# Patient Record
Sex: MALE | Race: WHITE | NOT HISPANIC OR LATINO
[De-identification: names, ages, dates, MRNs, and addresses within clinical notes are randomized per-mention and may not be internally consistent; named-entity substitution may affect disease eponyms.]

---

## 2017-06-06 ENCOUNTER — APPOINTMENT (OUTPATIENT)
Dept: CHRONIC DISEASE MANAGEMENT | Facility: CLINIC | Age: 22
End: 2017-06-06

## 2017-06-06 VITALS — HEIGHT: 68 IN | BODY MASS INDEX: 44.1 KG/M2 | WEIGHT: 291 LBS

## 2017-07-17 ENCOUNTER — OUTPATIENT (OUTPATIENT)
Dept: OUTPATIENT SERVICES | Facility: HOSPITAL | Age: 22
LOS: 1 days | End: 2017-07-17

## 2017-07-17 VITALS
HEART RATE: 82 BPM | OXYGEN SATURATION: 97 % | TEMPERATURE: 98 F | SYSTOLIC BLOOD PRESSURE: 110 MMHG | DIASTOLIC BLOOD PRESSURE: 64 MMHG | RESPIRATION RATE: 16 BRPM | WEIGHT: 283.96 LBS | HEIGHT: 68 IN

## 2017-07-17 DIAGNOSIS — J35.1 HYPERTROPHY OF TONSILS: ICD-10-CM

## 2017-07-17 DIAGNOSIS — J34.3 HYPERTROPHY OF NASAL TURBINATES: ICD-10-CM

## 2017-07-17 DIAGNOSIS — G47.33 OBSTRUCTIVE SLEEP APNEA (ADULT) (PEDIATRIC): ICD-10-CM

## 2017-07-17 LAB
HCT VFR BLD CALC: 47.1 % — SIGNIFICANT CHANGE UP (ref 39–50)
HGB BLD-MCNC: 15.9 G/DL — SIGNIFICANT CHANGE UP (ref 13–17)
MCHC RBC-ENTMCNC: 29.3 PG — SIGNIFICANT CHANGE UP (ref 27–34)
MCHC RBC-ENTMCNC: 33.8 % — SIGNIFICANT CHANGE UP (ref 32–36)
MCV RBC AUTO: 86.7 FL — SIGNIFICANT CHANGE UP (ref 80–100)
NRBC # FLD: 0 — SIGNIFICANT CHANGE UP
PLATELET # BLD AUTO: 290 K/UL — SIGNIFICANT CHANGE UP (ref 150–400)
PMV BLD: 9.2 FL — SIGNIFICANT CHANGE UP (ref 7–13)
RBC # BLD: 5.43 M/UL — SIGNIFICANT CHANGE UP (ref 4.2–5.8)
RBC # FLD: 12.8 % — SIGNIFICANT CHANGE UP (ref 10.3–14.5)
WBC # BLD: 6.36 K/UL — SIGNIFICANT CHANGE UP (ref 3.8–10.5)
WBC # FLD AUTO: 6.36 K/UL — SIGNIFICANT CHANGE UP (ref 3.8–10.5)

## 2017-07-17 RX ORDER — SODIUM CHLORIDE 9 MG/ML
1000 INJECTION, SOLUTION INTRAVENOUS
Qty: 0 | Refills: 0 | Status: DISCONTINUED | OUTPATIENT
Start: 2017-08-01 | End: 2017-08-16

## 2017-07-17 NOTE — H&P PST ADULT - NEGATIVE ENMT SYMPTOMS
no hearing difficulty/no nasal discharge/no nasal congestion/no vertigo/no gum bleeding/no sinus symptoms/no nose bleeds/no ear pain/no tinnitus

## 2017-07-17 NOTE — H&P PST ADULT - PROBLEM SELECTOR PLAN 1
22 yr old male scheduled for Septoplasty, Bilateral turbinectomy, Tonsillectomy on 8/01/2017  Pre op instruction given and patient verbalized understanding

## 2017-07-17 NOTE — H&P PST ADULT - RS GEN PE MLT RESP DETAILS PC
no wheezes/no rhonchi/no rales/respirations non-labored/airway patent/breath sounds equal/clear to auscultation bilaterally

## 2017-07-17 NOTE — H&P PST ADULT - HISTORY OF PRESENT ILLNESS
22 yr old male presents to PST with pre op dx: Hypertrophy of nasal turbinates, Hypertrophy of tonsils  for pre op evaluation, scheduled for Septoplasty, Bilateral turbinectomy Tonsillectomy on 8/01/2017

## 2017-07-25 ENCOUNTER — APPOINTMENT (OUTPATIENT)
Dept: CHRONIC DISEASE MANAGEMENT | Facility: CLINIC | Age: 22
End: 2017-07-25

## 2017-08-01 ENCOUNTER — RESULT REVIEW (OUTPATIENT)
Age: 22
End: 2017-08-01

## 2017-08-01 ENCOUNTER — OUTPATIENT (OUTPATIENT)
Dept: OUTPATIENT SERVICES | Facility: HOSPITAL | Age: 22
LOS: 1 days | Discharge: HOME | End: 2017-08-01
Payer: COMMERCIAL

## 2017-08-01 ENCOUNTER — APPOINTMENT (OUTPATIENT)
Dept: OTOLARYNGOLOGY | Facility: HOSPITAL | Age: 22
End: 2017-08-01

## 2017-08-01 VITALS
HEIGHT: 68 IN | SYSTOLIC BLOOD PRESSURE: 136 MMHG | DIASTOLIC BLOOD PRESSURE: 72 MMHG | WEIGHT: 283.96 LBS | RESPIRATION RATE: 16 BRPM | TEMPERATURE: 98 F | HEART RATE: 82 BPM | OXYGEN SATURATION: 97 %

## 2017-08-01 DIAGNOSIS — J35.1 HYPERTROPHY OF TONSILS: ICD-10-CM

## 2017-08-01 PROCEDURE — 88311 DECALCIFY TISSUE: CPT | Mod: 26

## 2017-08-01 PROCEDURE — 30130 EXCISE INFERIOR TURBINATE: CPT | Mod: 50,GC

## 2017-08-01 PROCEDURE — 88304 TISSUE EXAM BY PATHOLOGIST: CPT | Mod: 26

## 2017-08-01 PROCEDURE — 42826 REMOVAL OF TONSILS: CPT | Mod: GC

## 2017-08-01 PROCEDURE — 30520 REPAIR OF NASAL SEPTUM: CPT | Mod: GC

## 2017-08-01 RX ORDER — ONDANSETRON 8 MG/1
4 TABLET, FILM COATED ORAL ONCE
Qty: 0 | Refills: 0 | Status: DISCONTINUED | OUTPATIENT
Start: 2017-08-01 | End: 2017-08-16

## 2017-08-01 RX ORDER — ACETAMINOPHEN 500 MG
650 TABLET ORAL EVERY 6 HOURS
Qty: 0 | Refills: 0 | Status: DISCONTINUED | OUTPATIENT
Start: 2017-08-01 | End: 2017-08-16

## 2017-08-01 RX ORDER — HYDROMORPHONE HYDROCHLORIDE 2 MG/ML
0.5 INJECTION INTRAMUSCULAR; INTRAVENOUS; SUBCUTANEOUS
Qty: 0 | Refills: 0 | Status: DISCONTINUED | OUTPATIENT
Start: 2017-08-01 | End: 2017-08-01

## 2017-08-01 RX ORDER — ERGOCALCIFEROL 1.25 MG/1
1 CAPSULE ORAL
Qty: 0 | Refills: 0 | COMMUNITY

## 2017-08-01 RX ORDER — OXYCODONE HYDROCHLORIDE 5 MG/1
10 TABLET ORAL EVERY 6 HOURS
Qty: 0 | Refills: 0 | Status: DISCONTINUED | OUTPATIENT
Start: 2017-08-01 | End: 2017-08-01

## 2017-08-01 RX ORDER — OXYCODONE HYDROCHLORIDE 5 MG/1
5 TABLET ORAL EVERY 4 HOURS
Qty: 0 | Refills: 0 | Status: DISCONTINUED | OUTPATIENT
Start: 2017-08-01 | End: 2017-08-01

## 2017-08-01 RX ORDER — CEPHALEXIN 500 MG
500 CAPSULE ORAL EVERY 12 HOURS
Qty: 0 | Refills: 0 | Status: DISCONTINUED | OUTPATIENT
Start: 2017-08-01 | End: 2017-08-16

## 2017-08-01 RX ORDER — OXYCODONE AND ACETAMINOPHEN 5; 325 MG/1; MG/1
2 TABLET ORAL EVERY 6 HOURS
Qty: 0 | Refills: 0 | Status: DISCONTINUED | OUTPATIENT
Start: 2017-08-01 | End: 2017-08-01

## 2017-08-01 RX ORDER — BENZOCAINE AND MENTHOL 5; 1 G/100ML; G/100ML
1 LIQUID ORAL
Qty: 0 | Refills: 0 | Status: DISCONTINUED | OUTPATIENT
Start: 2017-08-01 | End: 2017-08-16

## 2017-08-01 RX ADMIN — HYDROMORPHONE HYDROCHLORIDE 0.5 MILLIGRAM(S): 2 INJECTION INTRAMUSCULAR; INTRAVENOUS; SUBCUTANEOUS at 19:08

## 2017-08-01 RX ADMIN — SODIUM CHLORIDE 30 MILLILITER(S): 9 INJECTION, SOLUTION INTRAVENOUS at 11:37

## 2017-08-01 RX ADMIN — HYDROMORPHONE HYDROCHLORIDE 0.5 MILLIGRAM(S): 2 INJECTION INTRAMUSCULAR; INTRAVENOUS; SUBCUTANEOUS at 18:56

## 2017-08-01 RX ADMIN — Medication 500 MILLIGRAM(S): at 18:59

## 2017-08-01 NOTE — PROGRESS NOTE ADULT - SUBJECTIVE AND OBJECTIVE BOX
Pt. fully recovered from Anesthesia.  No anesthesia complications.  AOX3, VSS.  Transfer care to Otolaryngology.  Pt. to remain in PACU overnight for LUIGI precautions.

## 2017-08-01 NOTE — PATIENT PROFILE ADULT. - FUNCTIONAL SCREEN CURRENT LEVEL: COMMUNICATION, MLM
(3) unable to understand or speak (not related to language barrier) (0) understands/communicates without difficulty

## 2017-08-02 ENCOUNTER — TRANSCRIPTION ENCOUNTER (OUTPATIENT)
Age: 22
End: 2017-08-02

## 2017-08-02 VITALS
RESPIRATION RATE: 16 BRPM | TEMPERATURE: 99 F | HEART RATE: 100 BPM | DIASTOLIC BLOOD PRESSURE: 64 MMHG | OXYGEN SATURATION: 97 % | SYSTOLIC BLOOD PRESSURE: 129 MMHG

## 2017-08-02 RX ORDER — CEPHALEXIN 500 MG
1 CAPSULE ORAL
Qty: 14 | Refills: 0 | OUTPATIENT
Start: 2017-08-02 | End: 2017-08-09

## 2017-08-02 RX ORDER — SODIUM CHLORIDE 0.65 %
2 AEROSOL, SPRAY (ML) NASAL
Qty: 1 | Refills: 4 | OUTPATIENT
Start: 2017-08-02

## 2017-08-02 RX ADMIN — Medication 500 MILLIGRAM(S): at 06:10

## 2017-08-02 NOTE — DISCHARGE NOTE ADULT - MEDICATION SUMMARY - MEDICATIONS TO TAKE
I will START or STAY ON the medications listed below when I get home from the hospital:    oxycodone-acetaminophen 5mg-325mg oral tablet  -- 1 tab(s) by mouth every 6 hours, As Needed -for severe pain MDD:4-6  -- Caution federal law prohibits the transfer of this drug to any person other  than the person for whom it was prescribed.  May cause drowsiness.  Alcohol may intensify this effect.  Use care when operating dangerous machinery.  This prescription cannot be refilled.  This product contains acetaminophen.  Do not use  with any other product containing acetaminophen to prevent possible liver damage.  Using more of this medication than prescribed may cause serious breathing problems.    -- Indication: For pain    cephalexin 500 mg oral capsule  -- 1 cap(s) by mouth every 12 hours  -- Indication: For post-op antibiiotic    Ayr Saline 0.65% nasal solution  -- 2 drop(s) into nose every 2 hours  -- For the nose.    -- Indication: For post op nasal spray    ergocalciferol 50,000 intl units (1.25 mg) oral capsule  -- 1 cap(s) by mouth once a week monday  -- Indication: For Home med

## 2017-08-02 NOTE — DISCHARGE NOTE ADULT - ADDITIONAL INSTRUCTIONS
Follow-up with Dr. Frances office in 1 week - [phone]    take keflex twice a day while packing in place    start nasal saline sprays in 24hours, 2 sprays each nostril every 2-3 hours while awake

## 2017-08-02 NOTE — DISCHARGE NOTE ADULT - NS AS ACTIVITY OBS
no heavy lifting or strenuous activities for 3-4 weeks No Heavy lifting/straining/Do not make important decisions/no heavy lifting or strenuous activities for 3-4 weeks

## 2017-08-02 NOTE — DISCHARGE NOTE ADULT - CARE PROVIDER_API CALL
Bogdan Moran), Otolaryngology  [address] [place] [id number]  Phone: [phone]  Fax: [phone]

## 2017-08-02 NOTE — PROGRESS NOTE ADULT - SUBJECTIVE AND OBJECTIVE BOX
Patient seen and examined.     No acute events overnight. No desats, no significant bleeding, pain adequately controlled.    NAD, alert, awake  Breathing comfortably on RA  Moustache dressing with SS drainage, packing removed    A/P  22M s/p septo, turbs, tonsillectomy 8/1.  -pain control  -keflex 500 QID  -d/c home

## 2017-08-02 NOTE — DISCHARGE NOTE ADULT - PLAN OF CARE
improve post op reg diet In the event you develop a complication and you are unable to reach your own physician, you may contact:  Ambulatory Surgery Unit [phone] Monday-Friday, 6am -9pm, or the emergency department at [phone]. also PACU [phone] reg diet,progress slowly:clear liquids to full liquids to soft foods as tolerated For the next 12hrs,DO NOT:Drive a car,operate power tools or machinery,drink alcohol,beer,or wine,make importatnt personal or business decisions. When taking pain meds - take with food and know it may cause constipation - Do NOT drive while on narcotics. Notify your physician if you experience:bleeding that does not stop,excessive swelling,persistent nausea & vomitting,inability to tolerate liquids or food,inability to urinate,pain not relieved by  medications,or fever greater than 101.

## 2017-08-02 NOTE — DISCHARGE NOTE ADULT - INSTRUCTIONS
reg diet change moustache dressing as needed as instructed reg diet,progress diet as tolerated:clear liquiods,full liquids then soft foods

## 2017-08-02 NOTE — DISCHARGE NOTE ADULT - HOSPITAL COURSE
s/p septo, inf turb reduction, tonsillectomy, did well post-op, tolerating PO well and packing removed on POD1 without issues. Was deemed hemodynamically stable for discharg [facility].

## 2017-08-02 NOTE — DISCHARGE NOTE ADULT - CARE PLAN
Principal Discharge DX:	Deviated nasal septum  Goal:	improve post op  Instructions for follow-up, activity and diet:	reg diet Principal Discharge DX:	Deviated nasal septum  Goal:	improve post op  Instructions for follow-up, activity and diet:	reg diet,progress slowly:clear liquids to full liquids to soft foods as tolerated  Instructions for follow-up, activity and diet:	In the event you develop a complication and you are unable to reach your own physician, you may contact:  Ambulatory Surgery Unit [phone] Monday-Friday, 6am -9pm, or the emergency department at [phone]. also PACU [phone] Principal Discharge DX:	Deviated nasal septum  Goal:	improve post op  Instructions for follow-up, activity and diet:	reg diet,progress slowly:clear liquids to full liquids to soft foods as tolerated  Instructions for follow-up, activity and diet:	In the event you develop a complication and you are unable to reach your own physician, you may contact:  Ambulatory Surgery Unit [phone] Monday-Friday, 6am -9pm, or the emergency department at [phone]. also PACU [phone]  Instructions for follow-up, activity and diet:	For the next 12hrs,DO NOT:Drive a car,operate power tools or machinery,drink alcohol,beer,or wine,make importatnt personal or business decisions.  Goal:	When taking pain meds - take with food and know it may cause constipation - Do NOT drive while on narcotics.  Goal:	Notify your physician if you experience:bleeding that does not stop,excessive swelling,persistent nausea & vomitting,inability to tolerate liquids or food,inability to urinate,pain not relieved by  medications,or fever greater than 101.

## 2017-08-02 NOTE — DISCHARGE NOTE ADULT - PATIENT PORTAL LINK FT
“You can access the Follow[facility] Patient Portal, offered by [facility], by registering with the following website: [email or web address]”

## 2017-08-07 LAB — SURGICAL PATHOLOGY STUDY: SIGNIFICANT CHANGE UP

## 2017-08-09 ENCOUNTER — APPOINTMENT (OUTPATIENT)
Dept: OTOLARYNGOLOGY | Facility: CLINIC | Age: 22
End: 2017-08-09
Payer: COMMERCIAL

## 2017-08-09 PROCEDURE — 99024 POSTOP FOLLOW-UP VISIT: CPT

## 2017-11-10 ENCOUNTER — APPOINTMENT (OUTPATIENT)
Dept: OTOLARYNGOLOGY | Facility: CLINIC | Age: 22
End: 2017-11-10
Payer: COMMERCIAL

## 2017-11-10 VITALS
HEIGHT: 68 IN | SYSTOLIC BLOOD PRESSURE: 126 MMHG | WEIGHT: 276 LBS | RESPIRATION RATE: 18 BRPM | DIASTOLIC BLOOD PRESSURE: 80 MMHG | BODY MASS INDEX: 41.83 KG/M2 | HEART RATE: 92 BPM

## 2017-11-10 VITALS
HEIGHT: 68 IN | SYSTOLIC BLOOD PRESSURE: 126 MMHG | WEIGHT: 276 LBS | HEART RATE: 92 BPM | DIASTOLIC BLOOD PRESSURE: 80 MMHG | BODY MASS INDEX: 41.83 KG/M2

## 2017-11-10 DIAGNOSIS — J35.1 HYPERTROPHY OF TONSILS: ICD-10-CM

## 2017-11-10 DIAGNOSIS — J34.2 DEVIATED NASAL SEPTUM: ICD-10-CM

## 2017-11-10 DIAGNOSIS — R73.03 PREDIABETES.: ICD-10-CM

## 2017-11-10 DIAGNOSIS — Z82.2 FAMILY HISTORY OF DEAFNESS AND HEARING LOSS: ICD-10-CM

## 2017-11-10 PROCEDURE — 99213 OFFICE O/P EST LOW 20 MIN: CPT

## 2019-02-04 NOTE — ASU PATIENT PROFILE, ADULT - TOBACCO USE
Last filled 12/28/18 Reviewed report generated by the [facility]. Does not demonstrate aberrancies or inconsistencies with regard to the prescribing of controlled medications to this patient by other providers. Never smoker

## 2019-04-15 NOTE — PATIENT PROFILE ADULT. - CAREGIVER ADDRESS
Reviewed by Dr. Bonilla, who offered an appointment on 04/18/19 at 8:00am. Yordan was agreeable to this and his appointment was changed.    s/a

## 2020-04-25 ENCOUNTER — MESSAGE (OUTPATIENT)
Age: 25
End: 2020-04-25

## 2020-05-11 LAB
SARS-COV-2 IGG SERPL IA-ACNC: <0.1 INDEX
SARS-COV-2 IGG SERPL QL IA: NEGATIVE

## 2023-05-12 NOTE — ASU PREOP CHECKLIST - SITE MARKED BY ANESTHESIOLOGIST
Orientation to room/Bed in low position, brakes on/Use of non-skid footwear for ambulating patients, use of appropriate size clothing to prevent risk of tripping/Assess eliminations need, assist as needed/Call light is within reach, educate patient/family on its functionality/Environment clear of unused equipment, furniture's in place, clear of hazards/Assess for adequate lighting, leave nightlight on/Patient and family education available to parents and patient/Document fall prevention teaching and include in plan of care n/a

## 2025-05-03 NOTE — ASU PREOP CHECKLIST - HEIGHT IN INCHES
Care Due:                  Date            Visit Type   Department     Provider  --------------------------------------------------------------------------------                                EP -                              [facility]  Last Visit: 11-      CARE ([facility])   VICKY Edwards                               -                              [facility]  Next Visit: 05-      CARE ([facility])   VICKY Edwards                                                            Last  Test          Frequency    Reason                     Performed    Due Date  --------------------------------------------------------------------------------    CMP.........  12 months..  DULoxetine, losartan,      05- 05-                             metFORMIN, rosuvastatin..    HBA1C.......  6 months...  metFORMIN................  05- 11-    Lipid Panel.  12 months..  rosuvastatin.............  05- 05-    [facility] Embedded Care Due Messages. Reference number: [id number].   5/03/2025 3:58:48 AM CDT   8

## 2025-07-17 NOTE — ASU PREOP CHECKLIST - BSA (M2)
Copied from CRM #[id number]. Topic: MW Messaging - MW Patient Request  >> Jul 17, 2025 12:41 PM Marni GILLETTE wrote:  --DO NOT REPLY - Sent from PACT - If sent to wrong pool, reroute to P ECO Reroute pool --    General Message: I have Lucas calling for results if someone could reach back out it would be appreciated    Callback #: [phone]    Can a detailed message be left? N/A  Caller has been advised this message will be addressed within:2-3 business days [routine]        
Please see 07/15/25 TE      Spoke to patient  Patient is aware of below results and recommendations made by provider.  Patient verbalized understanding and has no further questions at this time.     
2.37